# Patient Record
Sex: MALE | Race: BLACK OR AFRICAN AMERICAN | NOT HISPANIC OR LATINO | URBAN - METROPOLITAN AREA
[De-identification: names, ages, dates, MRNs, and addresses within clinical notes are randomized per-mention and may not be internally consistent; named-entity substitution may affect disease eponyms.]

---

## 2020-02-13 ENCOUNTER — INPATIENT (INPATIENT)
Facility: HOSPITAL | Age: 30
LOS: 2 days | Discharge: HOME | DRG: 554 | End: 2020-02-16
Attending: HOSPITALIST | Admitting: INTERNAL MEDICINE
Payer: MEDICAID

## 2020-02-13 VITALS
SYSTOLIC BLOOD PRESSURE: 109 MMHG | TEMPERATURE: 98 F | HEART RATE: 83 BPM | DIASTOLIC BLOOD PRESSURE: 66 MMHG | RESPIRATION RATE: 18 BRPM | OXYGEN SATURATION: 98 % | WEIGHT: 158.07 LBS | HEIGHT: 71 IN

## 2020-02-14 DIAGNOSIS — R63.8 OTHER SYMPTOMS AND SIGNS CONCERNING FOOD AND FLUID INTAKE: ICD-10-CM

## 2020-02-14 DIAGNOSIS — M25.461 EFFUSION, RIGHT KNEE: ICD-10-CM

## 2020-02-14 DIAGNOSIS — S02.609A FRACTURE OF MANDIBLE, UNSPECIFIED, INITIAL ENCOUNTER FOR CLOSED FRACTURE: Chronic | ICD-10-CM

## 2020-02-14 DIAGNOSIS — Z91.89 OTHER SPECIFIED PERSONAL RISK FACTORS, NOT ELSEWHERE CLASSIFIED: ICD-10-CM

## 2020-02-14 DIAGNOSIS — D64.9 ANEMIA, UNSPECIFIED: ICD-10-CM

## 2020-02-14 DIAGNOSIS — R05 COUGH: ICD-10-CM

## 2020-02-14 DIAGNOSIS — B35.1 TINEA UNGUIUM: ICD-10-CM

## 2020-02-14 DIAGNOSIS — D47.3 ESSENTIAL (HEMORRHAGIC) THROMBOCYTHEMIA: ICD-10-CM

## 2020-02-14 DIAGNOSIS — M79.671 PAIN IN RIGHT FOOT: ICD-10-CM

## 2020-02-14 DIAGNOSIS — B20 HUMAN IMMUNODEFICIENCY VIRUS [HIV] DISEASE: ICD-10-CM

## 2020-02-14 LAB
ANION GAP SERPL CALC-SCNC: 12 MMOL/L — SIGNIFICANT CHANGE UP (ref 5–17)
BASOPHILS # BLD AUTO: 0.03 K/UL — SIGNIFICANT CHANGE UP (ref 0–0.2)
BASOPHILS NFR BLD AUTO: 0.4 % — SIGNIFICANT CHANGE UP (ref 0–2)
BUN SERPL-MCNC: 8 MG/DL — SIGNIFICANT CHANGE UP (ref 7–23)
CALCIUM SERPL-MCNC: 8.9 MG/DL — SIGNIFICANT CHANGE UP (ref 8.4–10.5)
CHLORIDE SERPL-SCNC: 98 MMOL/L — SIGNIFICANT CHANGE UP (ref 96–108)
CO2 SERPL-SCNC: 28 MMOL/L — SIGNIFICANT CHANGE UP (ref 22–31)
CREAT SERPL-MCNC: 0.79 MG/DL — SIGNIFICANT CHANGE UP (ref 0.5–1.3)
CRP SERPL-MCNC: 7.29 MG/DL — HIGH (ref 0–0.4)
EOSINOPHIL # BLD AUTO: 0.31 K/UL — SIGNIFICANT CHANGE UP (ref 0–0.5)
EOSINOPHIL NFR BLD AUTO: 4.5 % — SIGNIFICANT CHANGE UP (ref 0–6)
ERYTHROCYTE [SEDIMENTATION RATE] IN BLOOD: 113 MM/HR — HIGH
FLU A RESULT: SIGNIFICANT CHANGE UP
FLU A RESULT: SIGNIFICANT CHANGE UP
FLUAV AG NPH QL: SIGNIFICANT CHANGE UP
FLUBV AG NPH QL: SIGNIFICANT CHANGE UP
GLUCOSE SERPL-MCNC: 101 MG/DL — HIGH (ref 70–99)
HCT VFR BLD CALC: 31 % — LOW (ref 39–50)
HGB BLD-MCNC: 9.7 G/DL — LOW (ref 13–17)
IMM GRANULOCYTES NFR BLD AUTO: 0.4 % — SIGNIFICANT CHANGE UP (ref 0–1.5)
LYMPHOCYTES # BLD AUTO: 1.69 K/UL — SIGNIFICANT CHANGE UP (ref 1–3.3)
LYMPHOCYTES # BLD AUTO: 24.5 % — SIGNIFICANT CHANGE UP (ref 13–44)
MCHC RBC-ENTMCNC: 22.7 PG — LOW (ref 27–34)
MCHC RBC-ENTMCNC: 31.3 GM/DL — LOW (ref 32–36)
MCV RBC AUTO: 72.4 FL — LOW (ref 80–100)
MONOCYTES # BLD AUTO: 0.52 K/UL — SIGNIFICANT CHANGE UP (ref 0–0.9)
MONOCYTES NFR BLD AUTO: 7.5 % — SIGNIFICANT CHANGE UP (ref 2–14)
NEUTROPHILS # BLD AUTO: 4.32 K/UL — SIGNIFICANT CHANGE UP (ref 1.8–7.4)
NEUTROPHILS NFR BLD AUTO: 62.7 % — SIGNIFICANT CHANGE UP (ref 43–77)
NRBC # BLD: 0 /100 WBCS — SIGNIFICANT CHANGE UP (ref 0–0)
PLATELET # BLD AUTO: 507 K/UL — HIGH (ref 150–400)
POTASSIUM SERPL-MCNC: 3.1 MMOL/L — LOW (ref 3.5–5.3)
POTASSIUM SERPL-SCNC: 3.1 MMOL/L — LOW (ref 3.5–5.3)
RBC # BLD: 4.28 M/UL — SIGNIFICANT CHANGE UP (ref 4.2–5.8)
RBC # FLD: 16.5 % — HIGH (ref 10.3–14.5)
RSV RESULT: SIGNIFICANT CHANGE UP
RSV RNA RESP QL NAA+PROBE: SIGNIFICANT CHANGE UP
SODIUM SERPL-SCNC: 138 MMOL/L — SIGNIFICANT CHANGE UP (ref 135–145)
WBC # BLD: 6.9 K/UL — SIGNIFICANT CHANGE UP (ref 3.8–10.5)
WBC # FLD AUTO: 6.9 K/UL — SIGNIFICANT CHANGE UP (ref 3.8–10.5)

## 2020-02-14 PROCEDURE — 99053 MED SERV 10PM-8AM 24 HR FAC: CPT

## 2020-02-14 PROCEDURE — 93010 ELECTROCARDIOGRAM REPORT: CPT

## 2020-02-14 PROCEDURE — 71045 X-RAY EXAM CHEST 1 VIEW: CPT | Mod: 26

## 2020-02-14 PROCEDURE — 99285 EMERGENCY DEPT VISIT HI MDM: CPT

## 2020-02-14 PROCEDURE — 73630 X-RAY EXAM OF FOOT: CPT | Mod: 26,50

## 2020-02-14 PROCEDURE — 73562 X-RAY EXAM OF KNEE 3: CPT | Mod: 26,RT

## 2020-02-14 PROCEDURE — 99222 1ST HOSP IP/OBS MODERATE 55: CPT

## 2020-02-14 RX ORDER — ENOXAPARIN SODIUM 100 MG/ML
40 INJECTION SUBCUTANEOUS EVERY 24 HOURS
Refills: 0 | Status: DISCONTINUED | OUTPATIENT
Start: 2020-02-14 | End: 2020-02-16

## 2020-02-14 RX ORDER — AZTREONAM 2 G
0 VIAL (EA) INJECTION
Qty: 0 | Refills: 0 | DISCHARGE

## 2020-02-14 RX ORDER — POTASSIUM CHLORIDE 20 MEQ
40 PACKET (EA) ORAL ONCE
Refills: 0 | Status: COMPLETED | OUTPATIENT
Start: 2020-02-14 | End: 2020-02-14

## 2020-02-14 RX ORDER — BICTEGRAVIR SODIUM, EMTRICITABINE, AND TENOFOVIR ALAFENAMIDE FUMARATE 30; 120; 15 MG/1; MG/1; MG/1
1 TABLET ORAL DAILY
Refills: 0 | Status: DISCONTINUED | OUTPATIENT
Start: 2020-02-14 | End: 2020-02-16

## 2020-02-14 RX ORDER — AZITHROMYCIN 500 MG/1
500 TABLET, FILM COATED ORAL ONCE
Refills: 0 | Status: COMPLETED | OUTPATIENT
Start: 2020-02-14 | End: 2020-02-14

## 2020-02-14 RX ORDER — IBUPROFEN 200 MG
1 TABLET ORAL
Qty: 16 | Refills: 0
Start: 2020-02-14

## 2020-02-14 RX ORDER — IBUPROFEN 200 MG
600 TABLET ORAL ONCE
Refills: 0 | Status: COMPLETED | OUTPATIENT
Start: 2020-02-14 | End: 2020-02-14

## 2020-02-14 RX ORDER — FLUCONAZOLE 150 MG/1
150 TABLET ORAL ONCE
Refills: 0 | Status: COMPLETED | OUTPATIENT
Start: 2020-02-14 | End: 2020-02-14

## 2020-02-14 RX ORDER — ACETAMINOPHEN 500 MG
650 TABLET ORAL EVERY 6 HOURS
Refills: 0 | Status: DISCONTINUED | OUTPATIENT
Start: 2020-02-14 | End: 2020-02-16

## 2020-02-14 RX ORDER — MORPHINE SULFATE 50 MG/1
4 CAPSULE, EXTENDED RELEASE ORAL ONCE
Refills: 0 | Status: DISCONTINUED | OUTPATIENT
Start: 2020-02-14 | End: 2020-02-14

## 2020-02-14 RX ORDER — IBUPROFEN 200 MG
1 TABLET ORAL
Qty: 0 | Refills: 0 | DISCHARGE

## 2020-02-14 RX ORDER — AZITHROMYCIN 500 MG/1
1 TABLET, FILM COATED ORAL
Qty: 4 | Refills: 0
Start: 2020-02-14

## 2020-02-14 RX ORDER — GABAPENTIN 400 MG/1
100 CAPSULE ORAL EVERY 8 HOURS
Refills: 0 | Status: DISCONTINUED | OUTPATIENT
Start: 2020-02-14 | End: 2020-02-16

## 2020-02-14 RX ORDER — LEVETIRACETAM 250 MG/1
250 TABLET, FILM COATED ORAL
Refills: 0 | Status: DISCONTINUED | OUTPATIENT
Start: 2020-02-14 | End: 2020-02-16

## 2020-02-14 RX ORDER — ACETAMINOPHEN 500 MG
650 TABLET ORAL EVERY 6 HOURS
Refills: 0 | Status: DISCONTINUED | OUTPATIENT
Start: 2020-02-14 | End: 2020-02-14

## 2020-02-14 RX ORDER — BICTEGRAVIR SODIUM, EMTRICITABINE, AND TENOFOVIR ALAFENAMIDE FUMARATE 30; 120; 15 MG/1; MG/1; MG/1
1 TABLET ORAL
Qty: 0 | Refills: 0 | DISCHARGE

## 2020-02-14 RX ADMIN — MORPHINE SULFATE 4 MILLIGRAM(S): 50 CAPSULE, EXTENDED RELEASE ORAL at 06:00

## 2020-02-14 RX ADMIN — Medication 40 MILLIEQUIVALENT(S): at 09:59

## 2020-02-14 RX ADMIN — GABAPENTIN 100 MILLIGRAM(S): 400 CAPSULE ORAL at 17:38

## 2020-02-14 RX ADMIN — LEVETIRACETAM 250 MILLIGRAM(S): 250 TABLET, FILM COATED ORAL at 17:38

## 2020-02-14 RX ADMIN — Medication 650 MILLIGRAM(S): at 14:40

## 2020-02-14 RX ADMIN — Medication 600 MILLIGRAM(S): at 03:00

## 2020-02-14 RX ADMIN — Medication 650 MILLIGRAM(S): at 12:11

## 2020-02-14 RX ADMIN — GABAPENTIN 100 MILLIGRAM(S): 400 CAPSULE ORAL at 09:59

## 2020-02-14 RX ADMIN — FLUCONAZOLE 150 MILLIGRAM(S): 150 TABLET ORAL at 12:11

## 2020-02-14 RX ADMIN — Medication 40 MILLIEQUIVALENT(S): at 04:00

## 2020-02-14 RX ADMIN — Medication 650 MILLIGRAM(S): at 19:08

## 2020-02-14 RX ADMIN — Medication 650 MILLIGRAM(S): at 19:33

## 2020-02-14 RX ADMIN — Medication 650 MILLIGRAM(S): at 12:41

## 2020-02-14 RX ADMIN — BICTEGRAVIR SODIUM, EMTRICITABINE, AND TENOFOVIR ALAFENAMIDE FUMARATE 1 TABLET(S): 30; 120; 15 TABLET ORAL at 12:11

## 2020-02-14 RX ADMIN — Medication 650 MILLIGRAM(S): at 14:17

## 2020-02-14 RX ADMIN — Medication 500 MILLIGRAM(S): at 15:37

## 2020-02-14 RX ADMIN — AZITHROMYCIN 500 MILLIGRAM(S): 500 TABLET, FILM COATED ORAL at 03:00

## 2020-02-14 RX ADMIN — Medication 1 TABLET(S): at 12:11

## 2020-02-14 RX ADMIN — Medication 500 MILLIGRAM(S): at 14:18

## 2020-02-14 NOTE — ED PROVIDER NOTE - PHYSICAL EXAMINATION
CONSTITUTIONAL: WD,WN. NAD.    SKIN: Normal color and turgor. No rash.    HEAD: NC/AT.  EYES: Conjunctiva clear. EOMI. PERRL.    ENT: Airway patent, OP without erythema, tonsillar swelling or exudate; uvula midline without swelling. Nasal mucosa clear, no rhinorrhea.   RESPIRATORY:  Breathing non-labored. No retractions or accessory muscle use.  Lungs CTA bilat.  CARDIOVASCULAR:  RRR, S1S2. No M/R/G.      GI:  Abdomen soft, nontender.    MSK: Neck supple with painless ROM.  Mild-moderate right knee swelling.  Full ROM. No erythema or warmth.  No calf tenderness or swelling.  No swelling, erythema, or warmth of either foot.  Strong DP and PT pulses.  Mild tenderness to plantar aspect of both feet.  Onychomycosis of all toes.    NEURO: Alert and oriented; CN II-XII grossly intact. Speech clear. 5/5 strength in all extremities.  Normal balance and gait. CONSTITUTIONAL: WD,WN. NAD.    SKIN: Normal color and turgor. No rash.    HEAD: NC/AT.  EYES: Conjunctiva clear. EOMI. PERRL.    ENT: Airway patent, OP without erythema, tonsillar swelling or exudate; uvula midline without swelling. Nasal mucosa clear, no rhinorrhea.   RESPIRATORY:  Breathing non-labored. No retractions or accessory muscle use.  Lungs CTA bilat.  CARDIOVASCULAR:  RRR, S1S2. No M/R/G.      GI:  Abdomen soft, nontender.    MSK: Neck supple with painless ROM.  Mild-moderate right knee swelling.  Full ROM. No erythema or warmth.  No calf tenderness or swelling.  No swelling, erythema, or warmth of either foot.  Strong DP and PT pulses.  Mild tenderness to plantar aspect of both feet.  Onychomycosis of all toes.    NEURO: Alert and oriented; CN II-XII grossly intact. Speech clear. 5/5 strength in all extremities. Sensation to plantar feet: SILT, able to distinguish pinprick from dull, great toe proprioception intact.  Gait: unsteady using cane, wide-based, shuffling.

## 2020-02-14 NOTE — H&P ADULT - NSHPLABSRESULTS_GEN_ALL_CORE
.  LABS:                         9.7    6.90  )-----------( 507      ( 14 Feb 2020 02:27 )             31.0     02-14    138  |  98  |  8   ----------------------------<  101<H>  3.1<L>   |  28  |  0.79    Ca    8.9      14 Feb 2020 02:15                    RADIOLOGY, EKG & ADDITIONAL TESTS: Reviewed.

## 2020-02-14 NOTE — H&P ADULT - ATTENDING COMMENTS
29M with HIV on biktarvy, bactrim DS p/w painful heels b/l x1y now worsening accompanied by intermittent R knee swelling    Exam: Lungs: clear wo wheezing, ronchi, rales. b/l extremities warm w strong DP/PT pulses. Thick toenails present. No effusion in ankle. Pain w palpation of achilles ankle insertion. R knee has slight increased effusion w/ pain in medial joint line. Slight increased warmth c/o contralateral side. No crepitus or locking w passive ROM. Strength in BLE decreased 2/2 pain but 4+/5. BUE 5/5.     #bilateral heel pain - Tendinitis likely. possibly exacerbated by poor support - pt wearing flip-flops for weeks.   #R knee effusion - has been recurrent. DDx broad including meniscal pathology due to intermittent locking/giving out. Crystal arthopathies also possible -- however previous tap was culture and crystal negative thus reactive arthritis also possible. Infection less likely  #Onychomycosis   #Microcytic anemia - no sxs or hx of recent blood loss  #Thrombocytopenia    Plan  Pain control - standing tylenol, naproxyn, adding gabapentin.   If ESR/CRP elevated would obtain arthocentesis of knee to evaluate for septic arthritis prior to trial of PO steroids  Podiatry c/s - to provide orthotics and f/u as outpt  ESR/CRP  Iron studies  Would defer tx of onychomycosis to outpatient  Continue w ambulation as tolerated

## 2020-02-14 NOTE — H&P ADULT - PROBLEM SELECTOR PLAN 2
Patient follows with PCP Dr. Warren and is on Biktarvy. Patient with unknown viral load and Cd4 count. Patient reports he ran out of his medicine 4 days ago, and that this does not happen to often. Patient does have right posterior cervical lymphadenopathy on exam but no other overt symptoms.  -HIV viral load and CD4 count  -c/w Biktarvy Patient follows with PCP Dr. Warren and is on Biktarvy. Patient with unknown viral load and Cd4 count. Patient reports he ran out of his medicine 4 days ago, and that this does not happen to often. Patient does have right posterior cervical lymphadenopathy on exam but no other overt symptoms.  -HIV viral load and CD4 count  -c/w Biktarvy  -Bactrim DS once daily

## 2020-02-14 NOTE — H&P ADULT - NSHPPHYSICALEXAM_GEN_ALL_CORE
.  VITAL SIGNS:  T(C): 36.6 (02-14-20 @ 07:20), Max: 36.8 (02-13-20 @ 23:13)  T(F): 97.8 (02-14-20 @ 07:20), Max: 98.3 (02-13-20 @ 23:13)  HR: 80 (02-14-20 @ 07:20) (77 - 83)  BP: 99/61 (02-14-20 @ 07:20) (99/61 - 109/66)  BP(mean): --  RR: 18 (02-14-20 @ 07:20) (18 - 18)  SpO2: 96% (02-14-20 @ 07:20) (96% - 98%)  Wt(kg): --    PHYSICAL EXAM:    Constitutional: WDWN resting comfortably in bed; NAD  Head: NC/AT  Eyes: PERRL, EOMI, anicteric sclera  ENT: no nasal discharge; uvula midline, no oropharyngeal erythema or exudates; MMM  Neck: supple; no JVD   Respiratory: CTA B/L; no W/R/R, no retractions  Cardiac: +S1/S2; RRR; no M/R/G;   Gastrointestinal: soft, NT/ND; no rebound or guarding;   Extremities: WWP, no clubbing or cyanosis; no peripheral edema  Musculoskeletal: NROM x4; small right knee effusion (no erythema or warmth present), muscle strength 5/5 throughout, reproducible pain at achilles tendon insertion site, no pinpoint pain on plantar surface  Vascular: 2+ radial, femoral, DP/PT pulses B/L  Dermatologic: toenail with evidence of thickening and white crusted appearace  Lymphatic: no submandibular or cervical LAD  Neurologic: AAOx3; CNII-XII grossly intact; no focal deficits, sensation intact  Psychiatric: affect and characteristics of appearance, verbalizations, behaviors are appropriate PHYSICAL EXAM:    Constitutional: WDWN resting comfortably in bed; NAD  Head: NC/AT  Eyes: PERRL, EOMI, anicteric sclera  ENT: no nasal discharge; uvula midline, no oropharyngeal erythema or exudates; MMM  Neck: supple; no JVD   Respiratory: CTA B/L; no W/R/R, no retractions  Cardiac: +S1/S2; RRR; no M/R/G;   Gastrointestinal: soft, NT/ND; no rebound or guarding;   Extremities: WWP, no clubbing or cyanosis; no peripheral edema  Musculoskeletal: NROM x4; small right knee effusion (no erythema, warmth present, slightly tender on palpation, knee ROM intact), muscle strength 5/5 throughout, reproducible pain at achilles tendon insertion site, (-)tinel sign at tarsal tunnel  Vascular: 2+ radial, femoral, DP/PT pulses B/L  Dermatologic: toenail with evidence of thickening and white crusted appearace  Lymphatic: no submandibular or cervical LAD  Neurologic: AAOx3; CNII-XII grossly intact; no focal deficits, sensation intact  Psychiatric: affect and characteristics of appearance, verbalizations, behaviors are appropriate PHYSICAL EXAM:    Constitutional: WDWN resting comfortably in bed; NAD  Head: NC/AT  Eyes: PERRL, EOMI, anicteric sclera  ENT: no nasal discharge; uvula midline, no oropharyngeal erythema or exudates; MMM  Neck: supple; no JVD   Respiratory: CTA B/L; no W/R/R, no retractions  Cardiac: +S1/S2; RRR; no M/R/G;   Gastrointestinal: soft, NT/ND; no rebound or guarding;   Extremities: WWP, no clubbing or cyanosis; no peripheral edema  Musculoskeletal: NROM x4; small right knee effusion (no erythema, warmth present, tender on palpation-medial knee, knee ROM intact), muscle strength 5/5 throughout, reproducible pain at achilles tendon insertion site, (-)tinel sign at tarsal tunnel  Vascular: 2+ radial, femoral, DP/PT pulses B/L  Dermatologic: toenail with evidence of thickening and white crusted appearace  Lymphatic: posterior cervical lymphadenopathy noted  Neurologic: AAOx3; CNII-XII grossly intact; no focal deficits, sensation intact  Psychiatric: affect and characteristics of appearance, verbalizations, behaviors are appropriate PHYSICAL EXAM:    Constitutional: WDWN resting comfortably in bed; NAD  Head: NC/AT  Eyes: PERRL, EOMI, anicteric sclera  ENT: no nasal discharge; uvula midline, no oropharyngeal erythema or exudates; MMM  Neck: supple; no JVD   Respiratory: CTA B/L; no W/R/R, no retractions  Cardiac: +S1/S2; RRR; no M/R/G;   Gastrointestinal: soft, NT/ND; no rebound or guarding;   Extremities: WWP, no clubbing or cyanosis; no peripheral edema  Musculoskeletal: NROM x4; small right knee effusion (no erythema, warmth present, tender on palpation-medial knee, knee ROM intact), muscle strength 5/5 throughout, reproducible pain at achilles tendon insertion site, (-)tinel sign at tarsal tunnel  Vascular: 2+ radial, femoral, DP/PT pulses B/L  Dermatologic: toenail with evidence of thickening and white crusted appearace  Lymphatic: posterior cervical lymphadenopathy noted  Neurologic: AAOx3; CNII-XII grossly intact; no focal deficits, sensation intact, patellar and achilles reflexes 2/4   Psychiatric: affect and characteristics of appearance, verbalizations, behaviors are appropriate

## 2020-02-14 NOTE — CONSULT NOTE ADULT - ASSESSMENT
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   -Recommend soft cast with CAM boot to immobilize ankle, will dispense two boots, though patient may only be able to ambulate with one on more symptomatic side  -Recommend NSAID for pain control  -WBAT b/l  -Patient will likely need surgical intervention in a staged approach to manage this painful deformity   -Patient should follow up with Dr. Mayank Suero within 1 week of discharge.    Office information:          [place] Address- [address] [address] Phone: [phone]         [place] Address- [address], [place] [address] Phone: [phone]

## 2020-02-14 NOTE — H&P ADULT - PROBLEM SELECTOR PLAN 3
Patient reports productive green cough last 4 days. Denies any recent sick contacts or Abx use. Given 500 mg azithromycin in the ED. Given stable vital signs, no signs of hypoxia, clear lung exam, and negative chest xray will hold of on Abx at this time.  -continue to monitor

## 2020-02-14 NOTE — PHYSICAL THERAPY INITIAL EVALUATION ADULT - GENERAL OBSERVATIONS, REHAB EVAL
Pt received supine in bed with +hep-lock, +bed alarm, NAD. Pt left as found, NAD, call bell in reach, +bed alarm RN awares.

## 2020-02-14 NOTE — PHYSICAL THERAPY INITIAL EVALUATION ADULT - GAIT DEVIATIONS NOTED, PT EVAL
decreased weight-shifting ability/unsteady gait, forward flexed posture/increased time in double stance

## 2020-02-14 NOTE — H&P ADULT - PROBLEM SELECTOR PLAN 1
Patient has had B/L foot pain for the past year or so, with no overt inciting event. Patient states that ibuprofen and motrin do not help. Patient brought in after recent fall (denies hitting head or LOC). Pain is reproducible at site where achilles tendon inserts. Patient has 5/5 muscle strength in LE (flexion, extension, abduction, adduction) and normal reflexes. Sensation is intact throughout. Differential includes chronic achilles tendonitis, HIV neuropathy, and tarsal tunnel syndrome (unlikely).  -gabapentin 100 mg TID  -B12, folate, Hep C, syphillis screen  -naproxen 500 mg Q12, rest, and ice to affected areas  -PT evaluate and treat  -resistance stretches

## 2020-02-14 NOTE — H&P ADULT - PROBLEM SELECTOR PLAN 6
likely in setting of HIV Patient with Hb 9.7 that is microcytic in nature. Most likely AICD given HIV status. Patient denies any melena or hematochezia.   -iron studies

## 2020-02-14 NOTE — PHYSICAL THERAPY INITIAL EVALUATION ADULT - ADDITIONAL COMMENTS
Pt lives with family in an apartment with elevator. Prior to admission, pt ambulated with SC that distributed from the other hospital.

## 2020-02-14 NOTE — H&P ADULT - PROBLEM SELECTOR PLAN 7
regular diet  replete as needed  no fluids  IMPROVE score of 2-lovenox 40 mg  DISPO-d/c pending PT evaluation  FULL CODE likely in setting of HIV

## 2020-02-14 NOTE — H&P ADULT - ASSESSMENT
Patient is a 30 yo male with PMH of HIV (on Biktarvy, Cd4 unknown, viral load) and seizure (last one 2 years ago, onset at age 6) who presents with the CC of B/L foot pain. Pain is sharp in nature and reproducible given location and symptoms differential includes chronic achilles tendonitis vs HIV neuropathy vs tarsal tunnel (less likely).
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
## 2020-02-14 NOTE — ED PROVIDER NOTE - CLINICAL SUMMARY MEDICAL DECISION MAKING FREE TEXT BOX
Chronic bilateral foot pain, suspected plantar fasciitis.  No signs of trauma, infection, or limb ischemia.  Also chronic right knee pain, has moderate effusion: good ROM, no erythema/warmth to suggest septic joint.  HIV with cough; CXR no definite infiltrate.  Afeb, HDS, normal O2 sat, no resp distress.  Not feeling weak.  Will tx with azithromycin.  Has primary care at [facility]. Chronic bilateral foot pain, consider plantar fasciitis, peripheral neuropathy.  No signs of trauma, infection, or limb ischemia.  Also chronic right knee pain, has moderate effusion: good ROM, no erythema/warmth to suggest septic joint.  HIV with cough; CXR no definite infiltrate.  Afeb, HDS, normal O2 sat, no resp distress.  Not feeling weak.  Will tx with azithromycin.  Has primary care at [facility].

## 2020-02-14 NOTE — H&P ADULT - PROBLEM SELECTOR PLAN 5
Patient with Hb 9.7 that is microcytic in nature. Most likely AICD given HIV status. Patient denies any melena or hematochezia.   -iron studies Patient with B/L toenail thickening with white crusted appearance. Was given ointment in the past but never used it.  -150 mg once weekly (2-4 weeks)  -fluconazole

## 2020-02-14 NOTE — H&P ADULT - PROBLEM SELECTOR PLAN 4
Patient with B/L toenail thickening with white crusted appearance. Was given ointment in the past but never used it.  -150 mg once weekly (2-4 weeks)  -fluconazole Patient with right knee swelling, which has been present for months. Patient denies any trauma to the region. Tenderness present along medial portion of knee and also the top portion. Given lack of erythema and normal vital signs less likely to be a septic joint. Differential include degenerative medial menesical tear vs pes anserine bursitis vs crystal arthropathy (less likely)  -ESR  -CRP
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
## 2020-02-14 NOTE — H&P ADULT - PROBLEM SELECTOR PLAN 8
1) PCP Contacted on Admission: (Y/N) --> Name & Phone #:  2) Date of Contact with PCP:  3) PCP Contacted at Discharge: (Y/N, N/A)  4) Summary of Handoff Given to PCP:   5) Post-Discharge Appointment Date and Location: regular diet  replete as needed  no fluids  IMPROVE score of 2-lovenox 40 mg  DISPO-d/c pending PT evaluation  FULL CODE

## 2020-02-14 NOTE — ED PROVIDER NOTE - OBJECTIVE STATEMENT
pt states he has had pain in the plantar aspect of both feet and right knee pain & swelling for over a year.  [place] and [place]; when he woke up he fell, states it occurred due to his chronic foot pain.  Denies any injuries from the fall but says his feet have been hurting more recently.  He says he went to another ER in [place] a few days ago, but walked out without being seen; he received a cane from that ER.  Other than that he denies seeking care in the past for his foot and knee pain.      He also reports a nonproductive cough for the past apx 4 days.  Says his chest hurts when he coughs, but otherwise no chest pain.  No sputum or hemoptysis.  No fever or chills.  No shortness of breath.  No abdominal pain, vomiting, or diarrhea.      PMHx seizure disorder, HIV (unknown CD4 and VL)  Meds depakote, Biktarvy  Allergies:  Phenergan  Social: never smoker, no IVDU pt states he has had pain in the plantar aspect of both feet and right knee pain & swelling for over a year.  [place] and [place]; when he woke up he fell, states it occurred due to his chronic foot pain.  Denies any injuries from the fall but says his feet have been hurting more recently and now having trouble walking for the past several days.  The pain in his feet is severe, but only when standing/walking.  Described as severe pressure.  No tingling or burning.  He says he went to another ER in [place] a few days ago, but walked out without being seen; he received a cane from that ER.  Other than that he denies seeking care in the past for his foot and knee pain.      He also reports a nonproductive cough for the past apx 4 days.  Says his chest hurts when he coughs, but otherwise no chest pain.  No sputum or hemoptysis.  No fever or chills.  No shortness of breath.  No abdominal pain, vomiting, or diarrhea.      PMHx seizure disorder, HIV (unknown CD4 and VL)  Meds depakote, Biktarvy  Allergies:  Phenergan  Social: never smoker, no IVDU

## 2020-02-14 NOTE — ED PROVIDER NOTE - NSFOLLOWUPINSTRUCTIONS_ED_ALL_ED_FT
Take antibiotic as prescribed.  Follow up with your primary care provider in 1-3 days.  Follow up at [facility] next Thursday 12 noon.  Call [phone] to schedule.  Return to the Emergency Department if you have any new or worsening symptoms, or if you have any concerns.  ==========================================    CHRONIC PAIN - AfterCare(R) Instructions(ER/ED)     Chronic Pain    WHAT YOU NEED TO KNOW:    Chronic pain is pain that does not get better for 3 months or longer. Chronic pain may hurt all the time, or come and go.     DISCHARGE INSTRUCTIONS:    Call 911 or have someone call 911 for any of the following:     You are breathing slower than normal, or you have trouble breathing.      You cannot be awakened.      You have a seizure.    Return to the emergency department if:     Your heart is beating slower than normal.      Your heart feels like it is jumping or fluttering.       You cannot think clearly.    Contact your healthcare provider if:     You have side effects from prescription pain medicine, such as itching, nausea, or vomiting.      You have trouble sleeping.      Your pain gets worse, even after you take medicine.      You don't think the medicine is working.      You have questions or concerns about your condition or care.    Medicines: You may need any of the following:    Acetaminophen decreases pain and fever. It is available without a doctor's order. Ask how much to take and how often to take it. Follow directions. Read the labels of all other medicines you are using to see if they also contain acetaminophen, or ask your doctor or pharmacist. Acetaminophen can cause liver damage if not taken correctly. Do not use more than 4 grams (4,000 milligrams) total of acetaminophen in one day.       NSAIDs, such as ibuprofen, help decrease swelling, pain, and fever. This medicine is available with or without a doctor's order. NSAIDs can cause stomach bleeding or kidney problems in certain people. If you take blood thinner medicine, always ask your healthcare provider if NSAIDs are safe for you. Always read the medicine label and follow directions.      Prescription pain medicine called narcotics or opioids may be given. Ask your healthcare provider how to take this medicine safely.       Anesthetics can be rubbed on your skin or injected into a nerve or muscle to numb an area.      Other medicines may reduce pain, anxiety, muscle tension, or swelling.      Take your medicine as directed. Contact your healthcare provider if you think your medicine is not helping or if you have side effects. Tell him of her if you are allergic to any medicine. Keep a list of the medicines, vitamins, and herbs you take. Include the amounts, and when and why you take them. Bring the list or the pill bottles to follow-up visits. Carry your medicine list with you in case of an emergency.    Manage your chronic pain:     Apply heat on the area in pain for 20 to 30 minutes every 2 hours for as many days as directed. Heat helps decrease pain and muscle spasms.      Apply ice on the part of your body that hurts for 15 to 20 minutes every hour or as directed. Use an ice pack, or put crushed ice in a plastic bag. Cover it with a towel. Ice decreases pain and swelling, and helps prevent tissue damage.      Go to physical therapy as directed. A physical therapist teaches you exercises to help improve movement and strength, and to decrease pain.      Exercise for 30 minutes, 3 times a week. Regular physical activity can help decrease pain and improve your quality of life. Ask your healthcare provider about the best exercise plan for your type of pain.      Get enough sleep. Create a relaxing bedtime routine. Go to sleep and wake up at the same time every day. Avoid caffeine in the afternoon.       Talk with a counselor or therapist. A type of counseling called cognitive behavioral therapy (CBT) can help your chronic pain by changing the way you think about it. CBT can also improve your mood, sleep, and ability to move.    What you must know if you take narcotic pain medicine:     You may need to take a bowel movement softener. The most common side effect of prescription pain medicine is constipation. Bowel movement softeners are available over the counter.      Do not mix prescription pain medicines. This can cause an overdose of medicine, which can become life-threatening. Read labels. Make sure you know the ingredients in all of your medicines.      Do not drink alcohol when you take prescription pain medicine. It is not safe to mix narcotics or opioids with alcohol or illegal drugs.      Prescription pain medicine may impair your ability to drive or work safely. They may also cause dizziness and increase your risk for falling.       Store prescription pain medicine in a safe location at home. Keep your medicine away from children and other people. Never share your medicine with anyone.     Follow up with your healthcare provider as directed: You may be referred to a pain specialist. Write down your questions so you remember to ask them during your visits.   =============================================  ACUTE COUGH - AfterCare(R) Instructions(ER/ED)     Acute Cough    WHAT YOU NEED TO KNOW:    An acute cough can last up to 3 weeks. Common causes of an acute cough include a cold, allergies, or a lung infection.     DISCHARGE INSTRUCTIONS:    Return to the emergency department if:     You have trouble breathing or feel short of breath.      You cough up blood, or you see blood in your mucus.       You faint or feel weak or dizzy.       You have chest pain when you cough or take a deep breath.       You have new wheezing.     Contact your healthcare provider if:     You have a fever.       Your cough lasts longer than 4 weeks.       Your symptoms do not improve with treatment.       You have questions or concerns about your condition or care.     Medicines:     Medicines may be needed to stop the cough, decrease swelling in your airways, or help open your airways. Medicine may also be given to help you cough up mucus. Ask your healthcare provider what over-the-counter medicines you can take. If you have an infection caused by bacteria, you may need antibiotics.       Take your medicine as directed. Contact your healthcare provider if you think your medicine is not helping or if you have side effects. Tell him or her if you are allergic to any medicine. Keep a list of the medicines, vitamins, and herbs you take. Include the amounts, and when and why you take them. Bring the list or the pill bottles to follow-up visits. Carry your medicine list with you in case of an emergency.    Manage your symptoms:     Do not smoke and stay away from others who smoke. Nicotine and other chemicals in cigarettes and cigars can cause lung damage and make your cough worse. Ask your healthcare provider for information if you currently smoke and need help to quit. E-cigarettes or smokeless tobacco still contain nicotine. Talk to your healthcare provider before you use these products.       Drink extra liquids as directed. Liquids will help thin and loosen mucus so you can cough it up. Liquids will also help prevent dehydration. Examples of good liquids to drink include water, fruit juice, and broth. Do not drink liquids that contain caffeine. Caffeine can increase your risk for dehydration. Ask your healthcare provider how much liquid to drink each day.       Rest as directed. Do not do activities that make your cough worse, such as exercise.       Use a humidifier or vaporizer. Use a cool mist humidifier or a vaporizer to increase air moisture in your home. This may make it easier for you to breathe and help decrease your cough.       Eat 2 to 5 mL of honey 2 times each day. Honey can help thin mucus and decrease your cough.       Use cough drops or lozenges. These can help decrease throat irritation and your cough.     Follow up with your healthcare provider as directed: Write down your questions so you remember to ask them during your visits.

## 2020-02-14 NOTE — ED PROVIDER NOTE - PATIENT PORTAL LINK FT
You can access the FollowMyHealth Patient Portal offered by [facility] by registering at the following website: [email or web address]. By joining [employer]’s FollowMyHealth portal, you will also be able to view your health information using other applications (apps) compatible with our system.

## 2020-02-14 NOTE — ED PROVIDER NOTE - NS ED ROS FT
CONSTITUTIONAL: No fever, chills, or weakness  NEURO: No headache, no dizziness, no syncope; No focal weakness/tingling/numbness  EYES: No visual changes  ENT: No rhinorrhea or sore throat  PULM: HPI  CV: No palpitations.   GI: No abdominal pain, vomiting, or diarrhea  : No dysuria, hematuria, frequency  MSK: No neck pain or back pain  SKIN: no rash or unusual bruising

## 2020-02-14 NOTE — ED PROVIDER NOTE - CARE PLAN
Principal Discharge DX:	Chronic foot pain, unspecified laterality  Secondary Diagnosis:	Chronic pain of right knee  Secondary Diagnosis:	Cough Principal Discharge DX:	Gait disturbance  Secondary Diagnosis:	Chronic pain of right knee  Secondary Diagnosis:	Cough

## 2020-02-14 NOTE — ED PROVIDER NOTE - PROGRESS NOTE DETAILS
Patient with unsteady, wide-based gait. He states it is only due to pain.  Will increase analgesia and re-evaluate. Gait not better after getting morphine.  Still wide-based and shuffling. Says he has a lot of pain.  I do not feel he is safe to be discharged; will admit for pain control and PT evaluation. Gait not better after getting morphine.  Still wide-based and shuffling, not very steady even using his cane. Says he has a lot of pain when standing.  Do not feel he is safe to be discharged; will admit for pain control and PT evaluation. Gait not better after getting morphine.  Still wide-based and shuffling, not very steady even using his cane. Says he has a lot of pain when standing.  Do not feel he is safe to be discharged; will admit for pain control and PT evaluation, podiatry evaluation.  XR of feet ordered. Podiatry consult called, will evaluate.

## 2020-02-15 LAB
ALBUMIN SERPL ELPH-MCNC: 2.7 G/DL — LOW (ref 3.3–5)
ALP SERPL-CCNC: 63 U/L — SIGNIFICANT CHANGE UP (ref 40–120)
ALT FLD-CCNC: 6 U/L — LOW (ref 10–45)
ANION GAP SERPL CALC-SCNC: 9 MMOL/L — SIGNIFICANT CHANGE UP (ref 5–17)
AST SERPL-CCNC: 12 U/L — SIGNIFICANT CHANGE UP (ref 10–40)
BILIRUB SERPL-MCNC: <0.2 MG/DL — SIGNIFICANT CHANGE UP (ref 0.2–1.2)
BUN SERPL-MCNC: 12 MG/DL — SIGNIFICANT CHANGE UP (ref 7–23)
CALCIUM SERPL-MCNC: 8.4 MG/DL — SIGNIFICANT CHANGE UP (ref 8.4–10.5)
CHLORIDE SERPL-SCNC: 106 MMOL/L — SIGNIFICANT CHANGE UP (ref 96–108)
CO2 SERPL-SCNC: 24 MMOL/L — SIGNIFICANT CHANGE UP (ref 22–31)
CREAT SERPL-MCNC: 0.96 MG/DL — SIGNIFICANT CHANGE UP (ref 0.5–1.3)
GLUCOSE SERPL-MCNC: 97 MG/DL — SIGNIFICANT CHANGE UP (ref 70–99)
MAGNESIUM SERPL-MCNC: 1.9 MG/DL — SIGNIFICANT CHANGE UP (ref 1.6–2.6)
POTASSIUM SERPL-MCNC: 3.7 MMOL/L — SIGNIFICANT CHANGE UP (ref 3.5–5.3)
POTASSIUM SERPL-SCNC: 3.7 MMOL/L — SIGNIFICANT CHANGE UP (ref 3.5–5.3)
PROT SERPL-MCNC: 7.2 G/DL — SIGNIFICANT CHANGE UP (ref 6–8.3)
SODIUM SERPL-SCNC: 139 MMOL/L — SIGNIFICANT CHANGE UP (ref 135–145)
URATE SERPL-MCNC: 4.4 MG/DL — SIGNIFICANT CHANGE UP (ref 3.4–8.8)

## 2020-02-15 PROCEDURE — 99233 SBSQ HOSP IP/OBS HIGH 50: CPT | Mod: GC

## 2020-02-15 PROCEDURE — 99221 1ST HOSP IP/OBS SF/LOW 40: CPT

## 2020-02-15 RX ORDER — LIDOCAINE 4 G/100G
2 CREAM TOPICAL DAILY
Refills: 0 | Status: DISCONTINUED | OUTPATIENT
Start: 2020-02-15 | End: 2020-02-16

## 2020-02-15 RX ORDER — POTASSIUM CHLORIDE 20 MEQ
40 PACKET (EA) ORAL ONCE
Refills: 0 | Status: COMPLETED | OUTPATIENT
Start: 2020-02-15 | End: 2020-02-15

## 2020-02-15 RX ORDER — LANOLIN ALCOHOL/MO/W.PET/CERES
5 CREAM (GRAM) TOPICAL AT BEDTIME
Refills: 0 | Status: DISCONTINUED | OUTPATIENT
Start: 2020-02-15 | End: 2020-02-16

## 2020-02-15 RX ADMIN — Medication 650 MILLIGRAM(S): at 01:03

## 2020-02-15 RX ADMIN — ENOXAPARIN SODIUM 40 MILLIGRAM(S): 100 INJECTION SUBCUTANEOUS at 11:50

## 2020-02-15 RX ADMIN — LEVETIRACETAM 250 MILLIGRAM(S): 250 TABLET, FILM COATED ORAL at 06:31

## 2020-02-15 RX ADMIN — Medication 500 MILLIGRAM(S): at 02:32

## 2020-02-15 RX ADMIN — Medication 650 MILLIGRAM(S): at 20:46

## 2020-02-15 RX ADMIN — Medication 650 MILLIGRAM(S): at 06:30

## 2020-02-15 RX ADMIN — GABAPENTIN 100 MILLIGRAM(S): 400 CAPSULE ORAL at 17:15

## 2020-02-15 RX ADMIN — Medication 650 MILLIGRAM(S): at 12:49

## 2020-02-15 RX ADMIN — Medication 500 MILLIGRAM(S): at 17:15

## 2020-02-15 RX ADMIN — Medication 500 MILLIGRAM(S): at 01:03

## 2020-02-15 RX ADMIN — LEVETIRACETAM 250 MILLIGRAM(S): 250 TABLET, FILM COATED ORAL at 17:21

## 2020-02-15 RX ADMIN — Medication 650 MILLIGRAM(S): at 02:32

## 2020-02-15 RX ADMIN — Medication 40 MILLIEQUIVALENT(S): at 16:30

## 2020-02-15 RX ADMIN — BICTEGRAVIR SODIUM, EMTRICITABINE, AND TENOFOVIR ALAFENAMIDE FUMARATE 1 TABLET(S): 30; 120; 15 TABLET ORAL at 11:50

## 2020-02-15 RX ADMIN — Medication 5 MILLIGRAM(S): at 22:35

## 2020-02-15 RX ADMIN — GABAPENTIN 100 MILLIGRAM(S): 400 CAPSULE ORAL at 09:03

## 2020-02-15 RX ADMIN — Medication 1 TABLET(S): at 11:50

## 2020-02-15 RX ADMIN — GABAPENTIN 100 MILLIGRAM(S): 400 CAPSULE ORAL at 01:03

## 2020-02-15 RX ADMIN — Medication 650 MILLIGRAM(S): at 13:40

## 2020-02-15 RX ADMIN — Medication 650 MILLIGRAM(S): at 21:26

## 2020-02-15 RX ADMIN — Medication 650 MILLIGRAM(S): at 07:48

## 2020-02-15 NOTE — PROGRESS NOTE ADULT - PROBLEM SELECTOR PLAN 8
regular diet  replete as needed  no fluids  IMPROVE score of 2-lovenox 40 mg  DISPO-d/c pending PT evaluation  FULL CODE
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
## 2020-02-15 NOTE — PROGRESS NOTE ADULT - PROBLEM SELECTOR PLAN 2
Patient follows with PCP Dr. Warren and is on Biktarvy. Patient with unknown viral load and Cd4 count. Patient reports he ran out of his medicine 4 days ago, and that this does not happen to often. Patient does have right posterior cervical lymphadenopathy on exam but no other overt symptoms.  -HIV viral load and CD4 count  -c/w Biktarvy  -Bactrim DS once daily

## 2020-02-15 NOTE — PROGRESS NOTE ADULT - ASSESSMENT
Patient is a 28 yo male with PMH of HIV (on Biktarvy, Cd4 unknown, viral load) and seizure (last one 2 years ago, onset at age 6) who presents with the CC of B/L foot pain. Pain is sharp in nature and reproducible given location and symptoms differential includes chronic achilles tendonitis vs HIV neuropathy vs tarsal tunnel (less likely).

## 2020-02-15 NOTE — PROGRESS NOTE ADULT - PROBLEM SELECTOR PLAN 1
Unclear etiology but given elevated ESR/CRP will investigate further with Ortho aspiration of the knee  -Podiatry consulted appreciate recs  -Pain control  -gabapentin 100 mg TID  -naproxen 500 mg Q12, rest, and ice to affected areas  -PT evaluate and treat  -resistance stretches  -Check GC/Chlamydia

## 2020-02-15 NOTE — PROGRESS NOTE ADULT - SUBJECTIVE AND OBJECTIVE BOX
Subjective:  No overnight events, pt feels well. Has no new acute complaints.     MEDICATIONS  (STANDING):  acetaminophen   Tablet .. 650 milliGRAM(s) Oral every 6 hours  bictegravir 50 mG/emtricitabine 200 mG/tenofovir alafenamide 25 mG (BIKTARVY) 1 Tablet(s) Oral daily  enoxaparin Injectable 40 milliGRAM(s) SubCutaneous every 24 hours  gabapentin 100 milliGRAM(s) Oral every 8 hours  levETIRAcetam 250 milliGRAM(s) Oral two times a day  lidocaine   Patch 2 Patch Transdermal daily  naproxen 500 milliGRAM(s) Oral every 12 hours  trimethoprim  160 mG/sulfamethoxazole 800 mG 1 Tablet(s) Oral daily    MEDICATIONS  (PRN):  melatonin 5 milliGRAM(s) Oral at bedtime PRN Insomnia    Drug Dosing Weight  Height (cm): 177.8 (14 Feb 2020 07:20)  Weight (kg): 67.7 (14 Feb 2020 07:20)  BMI (kg/m2): 21.4 (14 Feb 2020 07:20)  BSA (m2): 1.84 (14 Feb 2020 07:20)    PAST MEDICAL & SURGICAL HISTORY:  Seizure  HIV (human immunodeficiency virus infection)  Jaw fracture    Vital Signs Last 24 Hrs  T(C): 36.6 (15 Feb 2020 13:54), Max: 37 (14 Feb 2020 15:57)  T(F): 97.9 (15 Feb 2020 13:54), Max: 98.6 (14 Feb 2020 15:57)  HR: 80 (15 Feb 2020 13:54) (80 - 90)  BP: 110/65 (15 Feb 2020 13:54) (98/56 - 110/65)  BP(mean): --  ABP: --  ABP(mean): --  RR: 18 (15 Feb 2020 13:54) (18 - 18)  SpO2: 98% (15 Feb 2020 13:54) (95% - 99%)    I&O's Detail    14 Feb 2020 07:01  -  15 Feb 2020 07:00  --------------------------------------------------------  IN:  Total IN: 0 mL    OUT:    Voided: 925 mL  Total OUT: 925 mL    Total NET: -925 mL    PHYSICAL EXAM:    Constitutional: NAD  Eyes: PERRLA  ENMT: MMM  Neck: supple  Back: midline  Respiratory: CTA c/l  Cardiovascular: rrr, s1s2, no m/r/g  Gastrointestinal: soft, NTND, + BS  Extremities: wwp  Vascular: + 2 pulses DP/TP, radial  Neurological: grossly intact  Skin: no rash  Lymph Nodes: no LAD  Musculoskeletal: no joint swelling  Psychiatric: normal affect    LABS:  CBC Full  -  ( 14 Feb 2020 02:27 )  WBC Count : 6.90 K/uL  RBC Count : 4.28 M/uL  Hemoglobin : 9.7 g/dL  Hematocrit : 31.0 %  Platelet Count - Automated : 507 K/uL  Mean Cell Volume : 72.4 fl  Mean Cell Hemoglobin : 22.7 pg  Mean Cell Hemoglobin Concentration : 31.3 gm/dL  Auto Neutrophil # : 4.32 K/uL  Auto Lymphocyte # : 1.69 K/uL  Auto Monocyte # : 0.52 K/uL  Auto Eosinophil # : 0.31 K/uL  Auto Basophil # : 0.03 K/uL  Auto Neutrophil % : 62.7 %  Auto Lymphocyte % : 24.5 %  Auto Monocyte % : 7.5 %  Auto Eosinophil % : 4.5 %  Auto Basophil % : 0.4 %    02-15    139  |  106  |  12  ----------------------------<  97  3.7   |  24  |  0.96    Ca    8.4      15 Feb 2020 08:45    TPro  7.2  /  Alb  2.7<L>  /  TBili  <0.2  /  DBili  x   /  AST  12  /  ALT  6<L>  /  AlkPhos  63  02-15          RADIOLOGY & ADDITIONAL STUDIES:

## 2020-02-15 NOTE — PROGRESS NOTE ADULT - PROBLEM SELECTOR PLAN 6
Patient with Hb 9.7 that is microcytic in nature. Most likely AICD given HIV status. Patient denies any melena or hematochezia.   -iron studies

## 2020-02-16 VITALS
HEART RATE: 85 BPM | RESPIRATION RATE: 16 BRPM | OXYGEN SATURATION: 99 % | TEMPERATURE: 98 F | SYSTOLIC BLOOD PRESSURE: 108 MMHG | DIASTOLIC BLOOD PRESSURE: 69 MMHG

## 2020-02-16 LAB
BASOPHILS # BLD AUTO: 0.04 K/UL — SIGNIFICANT CHANGE UP (ref 0–0.2)
BASOPHILS NFR BLD AUTO: 0.4 % — SIGNIFICANT CHANGE UP (ref 0–2)
CRP SERPL-MCNC: 6.28 MG/DL — HIGH (ref 0–0.4)
EOSINOPHIL # BLD AUTO: 0.42 K/UL — SIGNIFICANT CHANGE UP (ref 0–0.5)
EOSINOPHIL NFR BLD AUTO: 4.4 % — SIGNIFICANT CHANGE UP (ref 0–6)
ERYTHROCYTE [SEDIMENTATION RATE] IN BLOOD: 129 MM/HR — HIGH
FERRITIN SERPL-MCNC: 288 NG/ML — SIGNIFICANT CHANGE UP (ref 30–400)
FOLATE SERPL-MCNC: 3.8 NG/ML — LOW
HCT VFR BLD CALC: 26.2 % — LOW (ref 39–50)
HGB BLD-MCNC: 8.1 G/DL — LOW (ref 13–17)
IMM GRANULOCYTES NFR BLD AUTO: 0.4 % — SIGNIFICANT CHANGE UP (ref 0–1.5)
IRON SATN MFR SERPL: 17 % — SIGNIFICANT CHANGE UP (ref 16–55)
IRON SATN MFR SERPL: 30 UG/DL — LOW (ref 45–165)
LYMPHOCYTES # BLD AUTO: 2.37 K/UL — SIGNIFICANT CHANGE UP (ref 1–3.3)
LYMPHOCYTES # BLD AUTO: 24.9 % — SIGNIFICANT CHANGE UP (ref 13–44)
MCHC RBC-ENTMCNC: 22.1 PG — LOW (ref 27–34)
MCHC RBC-ENTMCNC: 30.9 GM/DL — LOW (ref 32–36)
MCV RBC AUTO: 71.4 FL — LOW (ref 80–100)
MONOCYTES # BLD AUTO: 0.56 K/UL — SIGNIFICANT CHANGE UP (ref 0–0.9)
MONOCYTES NFR BLD AUTO: 5.9 % — SIGNIFICANT CHANGE UP (ref 2–14)
NEUTROPHILS # BLD AUTO: 6.1 K/UL — SIGNIFICANT CHANGE UP (ref 1.8–7.4)
NEUTROPHILS NFR BLD AUTO: 64 % — SIGNIFICANT CHANGE UP (ref 43–77)
NRBC # BLD: 0 /100 WBCS — SIGNIFICANT CHANGE UP (ref 0–0)
PLATELET # BLD AUTO: 434 K/UL — HIGH (ref 150–400)
RBC # BLD: 3.67 M/UL — LOW (ref 4.2–5.8)
RBC # FLD: 16.6 % — HIGH (ref 10.3–14.5)
RHEUMATOID FACT SERPL-ACNC: 12 IU/ML — SIGNIFICANT CHANGE UP (ref 0–13)
TIBC SERPL-MCNC: 173 UG/DL — LOW (ref 220–430)
TRANSFERRIN SERPL-MCNC: 141 MG/DL — LOW (ref 200–360)
UIBC SERPL-MCNC: 143 UG/DL — SIGNIFICANT CHANGE UP (ref 110–370)
VIT B12 SERPL-MCNC: 470 PG/ML — SIGNIFICANT CHANGE UP (ref 232–1245)
WBC # BLD: 9.53 K/UL — SIGNIFICANT CHANGE UP (ref 3.8–10.5)
WBC # FLD AUTO: 9.53 K/UL — SIGNIFICANT CHANGE UP (ref 3.8–10.5)

## 2020-02-16 PROCEDURE — 83550 IRON BINDING TEST: CPT

## 2020-02-16 PROCEDURE — 87591 N.GONORRHOEAE DNA AMP PROB: CPT

## 2020-02-16 PROCEDURE — 82746 ASSAY OF FOLIC ACID SERUM: CPT

## 2020-02-16 PROCEDURE — 71045 X-RAY EXAM CHEST 1 VIEW: CPT

## 2020-02-16 PROCEDURE — 86140 C-REACTIVE PROTEIN: CPT

## 2020-02-16 PROCEDURE — 99239 HOSP IP/OBS DSCHRG MGMT >30: CPT | Mod: GC

## 2020-02-16 PROCEDURE — 83735 ASSAY OF MAGNESIUM: CPT

## 2020-02-16 PROCEDURE — 82607 VITAMIN B-12: CPT

## 2020-02-16 PROCEDURE — 80048 BASIC METABOLIC PNL TOTAL CA: CPT

## 2020-02-16 PROCEDURE — 80053 COMPREHEN METABOLIC PANEL: CPT

## 2020-02-16 PROCEDURE — 97116 GAIT TRAINING THERAPY: CPT

## 2020-02-16 PROCEDURE — 97530 THERAPEUTIC ACTIVITIES: CPT

## 2020-02-16 PROCEDURE — 86431 RHEUMATOID FACTOR QUANT: CPT

## 2020-02-16 PROCEDURE — 87536 HIV-1 QUANT&REVRSE TRNSCRPJ: CPT

## 2020-02-16 PROCEDURE — 36415 COLL VENOUS BLD VENIPUNCTURE: CPT

## 2020-02-16 PROCEDURE — 82728 ASSAY OF FERRITIN: CPT

## 2020-02-16 PROCEDURE — 87631 RESP VIRUS 3-5 TARGETS: CPT

## 2020-02-16 PROCEDURE — 73562 X-RAY EXAM OF KNEE 3: CPT

## 2020-02-16 PROCEDURE — 84550 ASSAY OF BLOOD/URIC ACID: CPT

## 2020-02-16 PROCEDURE — 73630 X-RAY EXAM OF FOOT: CPT

## 2020-02-16 PROCEDURE — 93005 ELECTROCARDIOGRAM TRACING: CPT

## 2020-02-16 PROCEDURE — 85025 COMPLETE CBC W/AUTO DIFF WBC: CPT

## 2020-02-16 PROCEDURE — 86200 CCP ANTIBODY: CPT

## 2020-02-16 PROCEDURE — 83540 ASSAY OF IRON: CPT

## 2020-02-16 PROCEDURE — 99285 EMERGENCY DEPT VISIT HI MDM: CPT | Mod: 25

## 2020-02-16 PROCEDURE — 84466 ASSAY OF TRANSFERRIN: CPT

## 2020-02-16 PROCEDURE — 97162 PT EVAL MOD COMPLEX 30 MIN: CPT

## 2020-02-16 PROCEDURE — 96372 THER/PROPH/DIAG INJ SC/IM: CPT

## 2020-02-16 PROCEDURE — 85652 RBC SED RATE AUTOMATED: CPT

## 2020-02-16 RX ORDER — IBUPROFEN 200 MG
1 TABLET ORAL
Qty: 0 | Refills: 0 | DISCHARGE

## 2020-02-16 RX ORDER — GABAPENTIN 400 MG/1
1 CAPSULE ORAL
Qty: 90 | Refills: 0
Start: 2020-02-16 | End: 2020-03-16

## 2020-02-16 RX ORDER — LEVETIRACETAM 250 MG/1
1 TABLET, FILM COATED ORAL
Qty: 60 | Refills: 0
Start: 2020-02-16 | End: 2020-03-16

## 2020-02-16 RX ORDER — LEVETIRACETAM 250 MG/1
1 TABLET, FILM COATED ORAL
Qty: 0 | Refills: 0 | DISCHARGE

## 2020-02-16 RX ADMIN — LIDOCAINE 2 PATCH: 4 CREAM TOPICAL at 12:21

## 2020-02-16 RX ADMIN — Medication 1 TABLET(S): at 12:21

## 2020-02-16 RX ADMIN — Medication 650 MILLIGRAM(S): at 01:58

## 2020-02-16 RX ADMIN — LEVETIRACETAM 250 MILLIGRAM(S): 250 TABLET, FILM COATED ORAL at 05:47

## 2020-02-16 RX ADMIN — BICTEGRAVIR SODIUM, EMTRICITABINE, AND TENOFOVIR ALAFENAMIDE FUMARATE 1 TABLET(S): 30; 120; 15 TABLET ORAL at 12:20

## 2020-02-16 RX ADMIN — GABAPENTIN 100 MILLIGRAM(S): 400 CAPSULE ORAL at 09:29

## 2020-02-16 RX ADMIN — Medication 500 MILLIGRAM(S): at 06:47

## 2020-02-16 RX ADMIN — ENOXAPARIN SODIUM 40 MILLIGRAM(S): 100 INJECTION SUBCUTANEOUS at 12:19

## 2020-02-16 RX ADMIN — GABAPENTIN 100 MILLIGRAM(S): 400 CAPSULE ORAL at 00:24

## 2020-02-16 RX ADMIN — Medication 650 MILLIGRAM(S): at 12:19

## 2020-02-16 RX ADMIN — Medication 650 MILLIGRAM(S): at 02:55

## 2020-02-16 RX ADMIN — Medication 500 MILLIGRAM(S): at 05:47

## 2020-02-16 RX ADMIN — Medication 650 MILLIGRAM(S): at 07:47

## 2020-02-16 RX ADMIN — Medication 650 MILLIGRAM(S): at 13:02

## 2020-02-16 NOTE — DISCHARGE NOTE PROVIDER - NSDCMRMEDTOKEN_GEN_ALL_CORE_FT
Bactrim  mg-160 mg oral tablet: orally once a day  Biktarvy oral tablet: 1 tab(s) orally once a day  Keppra 250 mg oral tablet: 1 tab(s) orally 2 times a day  Motrin 600 mg oral tablet: 1 tab(s) orally every 8 hours Bactrim  mg-160 mg oral tablet: orally once a day  Biktarvy oral tablet: 1 tab(s) orally once a day  Keppra 250 mg oral tablet: 1 tab(s) orally 2 times a day  Motrin 600 mg oral tablet: 1 tab(s) orally every 8 hours, As Needed  Neurontin 100 mg oral capsule: 1 cap(s) orally every 8 hours

## 2020-02-16 NOTE — DISCHARGE NOTE PROVIDER - HOSPITAL COURSE
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        Problem List/Main Diagnoses:    #Bilateral Foot Pain    Patient presented with bilateral ankle and foot pain that has persisted for at least one year. No trauma was associated with the event. X-rays in the ED were notable for small, right knee effusion and no fracture. On exam, patient had tenderness to palpation at the site of the achilles insertion site bilaterally. Lower extremity strength was 5/5 throughout. Patient did require a cane to walk and was able to bear weight without significant pain. Patient was treated with Lidocaine patches, Naproxen, Tylenol and Gabapentin. Patient was seen by Podiatry as well with recommendation to wear CAM boot. 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 PT evaluated patient with plan for discharge to home.        #Right Knee Effusion    Noted on x-ray to have a small, right knee effusion. Patient has remained afebrile with no leukocytosis on labs. On exam, patient able to actively range knee without pain and on passive flexion and extension had mild pain. No erythema was noted, patient had mild tenderness to palpation of the patella. Ligament and meniscal testing were negative.        #HIV    Patient with a history of HIV on Biktarvy which was continued during hospitalization. CD4 count and viral load still pending at the time of discharge.        #Cough    Patient reported productive green cough in the four days prior to admission. Denies any recent sick contacts or antibiotic use. Given 500 mg azithromycin in the ED. Given stable vital signs, no signs of hypoxia, clear lung exam, and negative chest xray antibiotics were held.        #Anemia    Microcytic anemia on labs. Hemoglobin around 8-9 during admission. No evidence of bleeding from history. Suspect anemia of chronic disease.        #Thrombocytosis    Thought to be reactive in the setting of HIV.         New medications:     Labs to be followed outpatient: basic labs (especially, hemoglobin, platelets, CD4 count, viral load)    Exam to be followed outpatient: basic exam, knee exam, MSK exam

## 2020-02-16 NOTE — DISCHARGE NOTE NURSING/CASE MANAGEMENT/SOCIAL WORK - NSDCPEFALRISK_GEN_ALL_CORE
Patient information on fall and injury prevention
Monet Daugherty), Psychiatry  [address] [place] [address]  Phone: [phone]  Fax: [phone]

## 2020-02-16 NOTE — PROGRESS NOTE ADULT - SUBJECTIVE AND OBJECTIVE BOX
SUBJECTIVE: Patient seen and examined. States R knee pain is improved however swelling is unchanged.  Pt did well o/n  No f/c/n/v/cp/sob.       OBJECTIVE:  NAD  Vital Signs Last 24 Hrs  T(C): 36.3 (16 Feb 2020 05:19), Max: 36.7 (15 Feb 2020 20:45)  T(F): 97.4 (16 Feb 2020 05:19), Max: 98.1 (15 Feb 2020 20:45)  HR: 93 (16 Feb 2020 05:19) (63 - 93)  BP: 95/65 (16 Feb 2020 05:19) (95/65 - 114/72)  BP(mean): 86 (15 Feb 2020 20:45) (86 - 86)  RR: 17 (16 Feb 2020 05:19) (17 - 18)  SpO2: 97% (16 Feb 2020 05:19) (97% - 99%)      Physical Exam:  General: Resting comfortably in bed. AAOx3. NAD.  Extremities:        LLE: No gross deformity. SILT L2-S1 distribution, symmetric. Knee flexion 5-140 degrees. TA/EHL/FHL/GS motor intact. WWP, DP 2+       RLE: Mildly swollen compared to left primarily in suprapatellar area. SILT L2-S1 distribution, symmetric. Knee flexion 5-100 degrees. TA/EHL/FHL/GS motor intact. WWP, DP 2+        02-15    139  |  106  |  12  ----------------------------<  97  3.7   |  24  |  0.96    Ca    8.4      15 Feb 2020 08:45  Mg     1.9     02-15    TPro  7.2  /  Alb  2.7<L>  /  TBili  <0.2  /  DBili  x   /  AST  12  /  ALT  6<L>  /  AlkPhos  63  02-15    A/P :  Pt is a 28yo Male with R knee pain, swelling  -    Pain control  -    Knee aspiration today  -    F/u WBC, ESR, CRP  -    Continue NSAIDs/colchicine  -    ACE compression wrap, elevation, ice  -    Weight bearing status: WBAT   -    Physical Therapy

## 2020-02-16 NOTE — DISCHARGE NOTE PROVIDER - NSDCCPCAREPLAN_GEN_ALL_CORE_FT
PRINCIPAL DISCHARGE DIAGNOSIS  Diagnosis: Breana's deformity  Assessment and Plan of Treatment:       SECONDARY DISCHARGE DIAGNOSES  Diagnosis: Anemia  Assessment and Plan of Treatment:     Diagnosis: HIV disease  Assessment and Plan of Treatment:     Diagnosis: Effusion of right knee joint  Assessment and Plan of Treatment:

## 2020-02-16 NOTE — DISCHARGE NOTE NURSING/CASE MANAGEMENT/SOCIAL WORK - PATIENT PORTAL LINK FT
You can access the FollowMyHealth Patient Portal offered by [facility] by registering at the following website: [email or web address]. By joining [employer]’s FollowMyHealth portal, you will also be able to view your health information using other applications (apps) compatible with our system.

## 2020-02-16 NOTE — DISCHARGE NOTE PROVIDER - CARE PROVIDER_API CALL
Nicolette Suero (TAYLOR)  Orthopaedic Surgery  [address]  [place] [address]  Phone: [phone]  Fax: [phone]  Follow Up Time:     Jerrod Rivera)  Internal Medicine; Rheumatology  [address]  [place] [address]  Phone: [phone]  Fax: [phone]  Follow Up Time:

## 2020-02-17 LAB
N GONORRHOEA RRNA SPEC QL NAA+PROBE: SIGNIFICANT CHANGE UP
SPECIMEN SOURCE: SIGNIFICANT CHANGE UP

## 2020-02-18 LAB
4/8 RATIO: 0.34 RATIO — LOW (ref 0.9–3.6)
ABS CD8: 595 /UL — SIGNIFICANT CHANGE UP (ref 142–740)
CCP IGG SERPL-ACNC: 27 UNITS — HIGH (ref 0–19)
CD16+CD56+ CELLS NFR BLD: 14 % — SIGNIFICANT CHANGE UP (ref 5–23)
CD16+CD56+ CELLS NFR SPEC: 140 /UL — SIGNIFICANT CHANGE UP (ref 71–410)
CD19 BLASTS SPEC-ACNC: 57 /UL — LOW (ref 84–469)
CD19 BLASTS SPEC-ACNC: 6 % — SIGNIFICANT CHANGE UP (ref 6–24)
CD3 BLASTS SPEC-ACNC: 77 % — SIGNIFICANT CHANGE UP (ref 59–83)
CD3 BLASTS SPEC-ACNC: 823 /UL — SIGNIFICANT CHANGE UP (ref 672–1870)
CD4 %: 18 % — LOW (ref 30–62)
CD8 %: 53 % — HIGH (ref 12–36)
HIV-1 VIRAL LOAD RESULT: ABNORMAL
HIV1 RNA # SERPL NAA+PROBE: SIGNIFICANT CHANGE UP
HIV1 RNA SER-IMP: SIGNIFICANT CHANGE UP
HIV1 RNA SERPL NAA+PROBE-ACNC: ABNORMAL
HIV1 RNA SERPL NAA+PROBE-LOG#: 4.25 — SIGNIFICANT CHANGE UP
RF+CCP IGG SER-IMP: ABNORMAL
T-CELL CD4 SUBSET PNL BLD: 203 /UL — LOW (ref 489–1457)

## 2020-02-21 DIAGNOSIS — G40.909 EPILEPSY, UNSPECIFIED, NOT INTRACTABLE, WITHOUT STATUS EPILEPTICUS: ICD-10-CM

## 2020-02-21 DIAGNOSIS — R05 COUGH: ICD-10-CM

## 2020-02-21 DIAGNOSIS — D50.9 IRON DEFICIENCY ANEMIA, UNSPECIFIED: ICD-10-CM

## 2020-02-21 DIAGNOSIS — B35.1 TINEA UNGUIUM: ICD-10-CM

## 2020-02-21 DIAGNOSIS — M92.61 JUVENILE OSTEOCHONDROSIS OF TARSUS, RIGHT ANKLE: ICD-10-CM

## 2020-02-21 DIAGNOSIS — Z21 ASYMPTOMATIC HUMAN IMMUNODEFICIENCY VIRUS [HIV] INFECTION STATUS: ICD-10-CM

## 2020-02-21 DIAGNOSIS — M79.671 PAIN IN RIGHT FOOT: ICD-10-CM

## 2020-02-21 DIAGNOSIS — M25.461 EFFUSION, RIGHT KNEE: ICD-10-CM

## 2020-02-21 DIAGNOSIS — D47.3 ESSENTIAL (HEMORRHAGIC) THROMBOCYTHEMIA: ICD-10-CM

## 2020-02-21 DIAGNOSIS — M92.62 JUVENILE OSTEOCHONDROSIS OF TARSUS, LEFT ANKLE: ICD-10-CM

## 2021-11-04 NOTE — PHYSICAL THERAPY INITIAL EVALUATION ADULT - THERAPY FREQUENCY, PT EVAL
well developed, well nourished , in no acute distress , ambulating without difficulty , normal communication ability 2-3x/week
